# Patient Record
Sex: FEMALE | Race: WHITE | Employment: UNEMPLOYED | URBAN - METROPOLITAN AREA
[De-identification: names, ages, dates, MRNs, and addresses within clinical notes are randomized per-mention and may not be internally consistent; named-entity substitution may affect disease eponyms.]

---
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
## 2025-01-05 ENCOUNTER — APPOINTMENT (OUTPATIENT)
Dept: GENERAL RADIOLOGY | Age: 20
End: 2025-01-05
Payer: MEDICAID

## 2025-01-05 ENCOUNTER — HOSPITAL ENCOUNTER (EMERGENCY)
Age: 20
Discharge: HOME | End: 2025-01-05
Payer: MEDICAID

## 2025-01-05 VITALS
TEMPERATURE: 99.9 F | BODY MASS INDEX: 25.8 KG/M2 | DIASTOLIC BLOOD PRESSURE: 88 MMHG | HEART RATE: 108 BPM | OXYGEN SATURATION: 95 % | HEIGHT: 67 IN | WEIGHT: 164.4 LBS | RESPIRATION RATE: 18 BRPM | SYSTOLIC BLOOD PRESSURE: 144 MMHG

## 2025-01-05 DIAGNOSIS — B97.89 VIRAL RESPIRATORY ILLNESS: Primary | ICD-10-CM

## 2025-01-05 DIAGNOSIS — J98.8 VIRAL RESPIRATORY ILLNESS: Primary | ICD-10-CM

## 2025-01-05 LAB
FLUAV RNA UPPER RESP QL NAA+PROBE: NEGATIVE
FLUBV AG NPH QL: NEGATIVE
SARS-COV-2 RDRP RESP QL NAA+PROBE: NOT DETECTED

## 2025-01-05 PROCEDURE — 6370000000 HC RX 637 (ALT 250 FOR IP): Performed by: PHYSICIAN ASSISTANT

## 2025-01-05 PROCEDURE — 6360000002 HC RX W HCPCS: Performed by: PHYSICIAN ASSISTANT

## 2025-01-05 PROCEDURE — 87635 SARS-COV-2 COVID-19 AMP PRB: CPT

## 2025-01-05 PROCEDURE — 71045 X-RAY EXAM CHEST 1 VIEW: CPT

## 2025-01-05 PROCEDURE — 87804 INFLUENZA ASSAY W/OPTIC: CPT

## 2025-01-05 PROCEDURE — 99284 EMERGENCY DEPT VISIT MOD MDM: CPT

## 2025-01-05 RX ORDER — ALBUTEROL SULFATE 90 UG/1
1-2 INHALANT RESPIRATORY (INHALATION) EVERY 6 HOURS PRN
Qty: 18 G | Refills: 0 | Status: SHIPPED | OUTPATIENT
Start: 2025-01-05

## 2025-01-05 RX ORDER — ALBUTEROL SULFATE 0.83 MG/ML
2.5 SOLUTION RESPIRATORY (INHALATION) ONCE
Status: COMPLETED | OUTPATIENT
Start: 2025-01-05 | End: 2025-01-05

## 2025-01-05 RX ORDER — BUTALBITAL, ACETAMINOPHEN AND CAFFEINE 50; 325; 40 MG/1; MG/1; MG/1
1 TABLET ORAL ONCE
Status: COMPLETED | OUTPATIENT
Start: 2025-01-05 | End: 2025-01-05

## 2025-01-05 RX ORDER — IBUPROFEN 600 MG/1
600 TABLET, FILM COATED ORAL
Status: COMPLETED | OUTPATIENT
Start: 2025-01-05 | End: 2025-01-05

## 2025-01-05 RX ORDER — IPRATROPIUM BROMIDE AND ALBUTEROL SULFATE 2.5; .5 MG/3ML; MG/3ML
1 SOLUTION RESPIRATORY (INHALATION) ONCE
Status: COMPLETED | OUTPATIENT
Start: 2025-01-05 | End: 2025-01-05

## 2025-01-05 RX ORDER — ACETAMINOPHEN 325 MG/1
650 TABLET ORAL
Status: COMPLETED | OUTPATIENT
Start: 2025-01-05 | End: 2025-01-05

## 2025-01-05 RX ORDER — DEXTROMETHORPHAN HYDROBROMIDE AND PROMETHAZINE HYDROCHLORIDE 15; 6.25 MG/5ML; MG/5ML
5 SYRUP ORAL EVERY 6 HOURS PRN
Qty: 120 ML | Refills: 0 | Status: SHIPPED | OUTPATIENT
Start: 2025-01-05 | End: 2025-01-12

## 2025-01-05 RX ORDER — PREDNISONE 10 MG/1
20 TABLET ORAL DAILY
Qty: 10 TABLET | Refills: 0 | Status: SHIPPED | OUTPATIENT
Start: 2025-01-05 | End: 2025-01-10

## 2025-01-05 RX ORDER — PREDNISONE 20 MG/1
20 TABLET ORAL ONCE
Status: COMPLETED | OUTPATIENT
Start: 2025-01-05 | End: 2025-01-05

## 2025-01-05 RX ORDER — IBUPROFEN 600 MG/1
600 TABLET, FILM COATED ORAL
Qty: 30 TABLET | Refills: 0 | Status: SHIPPED | OUTPATIENT
Start: 2025-01-05

## 2025-01-05 RX ADMIN — PREDNISONE 20 MG: 20 TABLET ORAL at 11:26

## 2025-01-05 RX ADMIN — ALBUTEROL SULFATE 2.5 MG: 2.5 SOLUTION RESPIRATORY (INHALATION) at 11:26

## 2025-01-05 RX ADMIN — IPRATROPIUM BROMIDE AND ALBUTEROL SULFATE 1 DOSE: 2.5; .5 SOLUTION RESPIRATORY (INHALATION) at 12:18

## 2025-01-05 RX ADMIN — IBUPROFEN 600 MG: 600 TABLET, FILM COATED ORAL at 11:21

## 2025-01-05 RX ADMIN — BUTALBITAL, ACETAMINOPHEN, AND CAFFEINE 1 TABLET: 50; 325; 40 TABLET ORAL at 11:21

## 2025-01-05 RX ADMIN — ACETAMINOPHEN 650 MG: 325 TABLET ORAL at 11:21

## 2025-01-05 ASSESSMENT — LIFESTYLE VARIABLES
HOW MANY STANDARD DRINKS CONTAINING ALCOHOL DO YOU HAVE ON A TYPICAL DAY: PATIENT DOES NOT DRINK
HOW OFTEN DO YOU HAVE A DRINK CONTAINING ALCOHOL: NEVER

## 2025-01-05 ASSESSMENT — PAIN - FUNCTIONAL ASSESSMENT: PAIN_FUNCTIONAL_ASSESSMENT: 0-10

## 2025-01-05 ASSESSMENT — PAIN SCALES - GENERAL
PAINLEVEL_OUTOF10: 5
PAINLEVEL_OUTOF10: 5
PAINLEVEL_OUTOF10: 4

## 2025-01-05 NOTE — ED PROVIDER NOTES
is aware to return should symptoms not improve or worsen.      I am the Primary Clinician of Record.  FINAL IMPRESSION      1. Viral respiratory illness          DISPOSITION/PLAN     DISPOSITION Decision To Discharge 01/05/2025 12:48:04 PM   DISPOSITION CONDITION Stable           PATIENT REFERRED TO:  Yasmeen Marks MD  [address] [place] [id number]  [phone]    Schedule an appointment as soon as possible for a visit in 3 days  As needed    [facility] ED  [address] [id number] [phone]  Go to   If symptoms worsen      DISCHARGE MEDICATIONS:  Discharge Medication List as of 1/5/2025 12:53 PM        START taking these medications    Details   albuterol sulfate HFA (PROVENTIL;VENTOLIN;PROAIR) 108 (90 Base) MCG/ACT inhaler Inhale 1-2 puffs into the lungs every 6 hours as needed for Wheezing, Disp-18 g, R-0Dispense with spacerNormal      promethazine-dextromethorphan (PROMETHAZINE-DM) 6.25-15 MG/5ML syrup Take 5 mLs by mouth every 6 hours as needed for Cough, Disp-120 mL, R-0Normal      predniSONE (DELTASONE) 10 MG tablet Take 2 tablets by mouth daily for 5 doses, Disp-10 tablet, R-0Normal      ibuprofen (ADVIL;MOTRIN) 600 MG tablet Take 1 tablet by mouth every 6-8 hours as needed for Pain, Disp-30 tablet, R-0Normal             DISCONTINUED MEDICATIONS:  Discharge Medication List as of 1/5/2025 12:53 PM                 (Please note that portions of this note were completed with a voice recognition program.  Efforts were made to edit the dictations but occasionally words are mis-transcribed.)    Brendon Mccabe PA-C (electronically signed)        Brendon Mccabe PA-C  01/05/25 [id number]

## 2025-01-05 NOTE — DISCHARGE INSTRUCTIONS
Your symptoms are consistent with influenza type A.  However flu study and COVID studies are negative.  Chest x-ray negative.  Wheezing noted.  Stop vaping.  Breathing treatments given with wheezing improvement in your chest.  First dose prednisone given in ED.  I have sent prescriptions for prednisone, cough medication and inhaler to local pharmacy.  Maintain good nutrition and increase hydration over the next couple of days.  Rest as needed.

## 2025-01-13 NOTE — ED PROVIDER NOTES
[address] [facility] 1 ED  eMERGENCY dEPARTMENT eNCOUnter      Pt Name: Francisco Watts  MRN: [id number]  [address] [facility] 2005  Date of evaluation: 9/11/2023  Provider: Lizette Matamoros MD    [address]       Chief Complaint   Patient presents with    Psychiatric Evaluation     Pt states she attempted suicide today by trying to drown herself in [address], bystanders called [phone] and stated they never saw pt go under water, pt denies any complaints at this time. HISTORY OF PRESENT ILLNESS   (Location/Symptom, Timing/Onset, Context/Setting, Quality, Duration, Modifying Factors, Severity)  Note limiting factors. History obtained from: the patient    Francisco Watts is a 25 y.o. female with history of depression who reports she is currently taking Zoloft who is brought to the emergency department by EMS after a intentional drowning attempt. The patient reports she was attempting to commit suicide today by drowning herself in the [facility]. [place]ers [place] and [place]. Patient reports she was floating on top of the leg but never had her head go underwater and denies any water going in her nose or mouth. Patient denies any shortness of breath or difficulty breathing at this time. Patient does report active suicidal ideation and depression. HPI    Nursing Notes were reviewed. REVIEW OFSYSTEMS    (2-9 systems for level 4, 10 or more for level 5)     Review of Systems   Constitutional:  Negative for appetite change, fever and unexpected weight change. HENT:  Negative for facial swelling, trouble swallowing and voice change. Eyes:  Negative for visual disturbance. Respiratory:  Negative for shortness of breath, wheezing and stridor. Cardiovascular:  Negative for chest pain and palpitations. Gastrointestinal:  Negative for abdominal pain, nausea and vomiting. Genitourinary:  Negative for dysuria and vaginal bleeding.    Musculoskeletal:  Negative Hematology/Oncology Follow Up    Date of Visit: 01/13/25  CC: Follow Up  Iron Deficiency Anemia    HISTORY OF PRESENT ILLNESS:  Ms. Amparo Hess is a 71 year old female patient with history of anemia, arrhythmia, asthma, chronic insomnia, chronic pain, degenerative joint disease, DVT LE 2013, GERD, fibromyalgia, hyperlipidemia, HTN, MGUS, pre-diabetes referred to our clinic for consultation regarding iron deficiency anemia    Patient recently admitted 4/2024 to [facility] for chest pain ACS rule out. EKG no ischmic changes and the troponin was unchanged. CT PE negative for PE. She was found to have iron deficiency anemia and sent home for ferrous sulfate. She was noted to have never had a colonoscopy and recommended to f/u w/PCP to arrange this. Ferritin 68, 6% iron saturation, Hb 9.1.    5/8/24 she was seen by her PCP for follow up post-hospitalization. Her Hb improved to 10.8. MCV of 75.9. Plt and WBC WNL.    Blood in stool/dark stool: denies  Vaginal bleeding: denies  Epistaxis: denies  Taking oral iron: yes, taking when she remembers, probably 3-4 times per week    12/2024: IV iron venofer 200mg x3    Interval/Subjective:  The patient presents for evaluation of iron deficiency, MGUS, and weight loss.    She has not yet undergone a colonoscopy and reports no bleeding, including hematochezia, melena, vaginal bleeding, or epistaxis. She also reports no gastrointestinal symptoms such as diarrhea or constipation. She was previously on iron supplementation, which was discontinued due to its efficacy. She received intravenous iron in 12/2024. She reports no shortness of breath or pagophagia but notes a high intake of cold water, approximately 6 to 8 bottles daily. She experiences difficulty swallowing certain foods, leading to regurgitation, despite thorough mastication.    She has a history of MGUS, for which she was under the care of Ralf for several years. She reports no abdominal pain.    She has experienced a  weight loss of 40 pounds over a period of 6 months, which she attributes to occasional forgetfulness in eating. She does not endorse any depressive symptoms. Her primary care physician, Dr. Pham, deemed the weight loss beneficial due to her previous overweight status. She gained 5 pounds during the holiday season. The weight loss trend ceased around 09/2024 or 10/2024.    Supplemental Information  She is scheduled to receive a hip injection. She reports pain in her hip and back.    FAMILY HISTORY  Her mother had polyps.    MEDICATIONS  Discontinued: iron pills          Past Medical History:   Diagnosis Date    Adjustment reaction to chronic stress 12/17/2015    Anemia     Arrhythmia 12/08/2011    Asthma     Atypical chest pain, chronic:  cards (froedert) 04/15/2013    Blood clot associated with vein wall inflammation     Blood transfusion     39 yrs ago    Chronic insomnia 09/22/2016    Chronic pain syndrome 12/17/2015    DJD (degenerative joint disease)     DVT of leg (deep venous thrombosis), left  06/22/2013    Dysphagia, unspecified:  GI 11/16/2011    Dysrhythmia     Elevated serum protein level     heme; neg eval    Fibromyalgia     GERD (gastroesophageal reflux disease) 05/06/2014    h/o neurocardiogenic syncope     Hypercholesterolemia     Hyperlipidemia     Hypertension     not on meds    MGUS (monoclonal gammopathy of unknown significance): heme yrly/prn 12/17/2015    Motion sickness     MVP (mitral valve prolapse)     Myalgia and myositis, unspecified 12/08/2011    Neurologic cardiac syncope 04/03/2012    resolved    Osteoarthritis     Osteoarthrosis, unspecified whether generalized or localized, lower leg: ortho: needs BTKR 09/13/2012    Other and unspecified hyperlipidemia 12/08/2011    Palpitations     PONV (postoperative nausea and vomiting)     extreme vertigo    Pre-diabetes 12/08/2011    Snoring: sleep study 09/22/2016       Past Surgical History:   Procedure Laterality Date    Carpal tunnel  release      bilatreral     delivery+postpartum care           delivery+postpartum care          Hysterectomy      hyst    Knee surgery      several knee scopes.  left knee    Total knee replacement Left 2021    Scout, Left TKA (prior HTO), [employer] implants    Total knee replacement Right 2024    Scout, Right TKA, [employer] implants     OB History    Para Term  AB Living   3 0 0 0 0 0   SAB IAB Ectopic Molar Multiple Live Births   0 0 0 0 0 2       ALLERGIES:  is allergic to penicillins.    MEDICATIONS:  Current Outpatient Medications   Medication Sig    electrolyte/PEG 3350 (Nulytely with Flavor Packs) 420 g solution Take as directed per colonoscopy prep letter    vitamin B-12 (CYANOCOBALAMIN) 1000 MCG tablet Take 1 tablet by mouth daily.    ferrous sulfate 325 (65 FE) MG tablet Take 325 mg by mouth every 48 hours.    acetaminophen (TYLENOL) 500 MG tablet Take 2 tablets by mouth every 8 hours.    lidocaine (LIDODERM) 5 % patch Place 3 patches onto the skin every 24 hours. Remove patch 12 hours after applying    Multiple Vitamin (MULTIVITAMIN ADULT PO) Take by mouth daily.     No current facility-administered medications for this visit.       Social History     Socioeconomic History    Marital status: Single     Spouse name: Not on file    Number of children: Not on file    Years of education: Not on file    Highest education level: Not on file   Occupational History    Not on file   Tobacco Use    Smoking status: Never    Smokeless tobacco: Never   Vaping Use    Vaping status: never used   Substance and Sexual Activity    Alcohol use: No    Drug use: No    Sexual activity: Not Currently     Comment: 15 yrs ago   Other Topics Concern    Not on file   Social History Narrative    Not on file     Social Determinants of Health     Financial Resource Strain: Low Risk  (2024)    Financial Resource Strain     Unable to Get: None   Food  Insecurity: No Food Insecurity (2024)    Received from [facility], [facility]    Hunger Vital Sign     Worried About Running Out of Food in the Last Year: Never true     Ran Out of Food in the Last Year: Never true   Transportation Needs: No Transportation Needs (2024)    Received from [facility], [facility]    PRAPARE - Transportation     Lack of Transportation (Medical): No     Lack of Transportation (Non-Medical): No   Physical Activity: High Risk (2024)    Exercise Vital Sign     Days of Exercise per Week: 0 days     Minutes of Exercise per Session: 0 min   Stress: High Risk (2024)    Stress     How Stressed: Quite a bit   Social Connections: Unknown (2024)    Received from [facility]    Social Connections     Social Connections: Last Flowsheet Data: Not on file     Social Connections: Recent Result: Not on file   Interpersonal Safety: Not At Risk (2024)    Received from [facility], [facility]    Humiliation, Afraid, Rape, and Kick questionnaire     Fear of Current or Ex-Partner: No     Emotionally Abused: No     Physically Abused: No     Sexually Abused: No       Family Status   Relation Name Status    Mo  Alive        alzheimers, diverticulitis.     Fa   at age 85        heart diseae, HTN    Bro x2 Alive        dm, heart dz, blind    Bro  Alive    Bianca  Alive    Son  Alive        heart nos   No partnership data on file       REVIEW OF SYSTEMS:   A 12 point review of systems was reviewed with the patient.  Pertinent positive and negative results are discussed above.  The entire review of systems is detailed in the Epic electronic health record.    PHYSICAL EXAMINATION:    The patient is a 71 year old female who is in no apparent distress.  VITALS:  Visit Vitals  BP (!)  142/94   Pulse (!) 57   Temp 97.1 °F (36.2 °C) (Oral)   Resp 16   Wt 91 kg (200 lb 9.6 oz)   SpO2 97%   BMI 33.38 kg/m²      ECO - Fully active, able to carry on all pre-disease activities without restrictions.  CONSTITUTIONAL:  Alert, cooperative, oriented.  Mood and affect appropriate.  Appears close to chronological age.  Well nourished.  Well developed.   HEENT:  Normocephalic, atraumatic.  Pupils are equal, round, reactive to light.  Extraocular movements are intact.  Sclerae anicteric.  Conjunctivae and corneas are clear. Oropharynx and oral cavity are unremarkable.    SKIN:  Skin examination is without rash or lesions.   PULMONARY:  Clear to auscultation without wheeze or rhonchi  CARDIOVASCULAR:  Cardiac examination shows a regular rate and rhythm.  Normal S1 and S2.  No murmur, rub or gallop.  ABDOMEN:  Abdomen is soft, nontender, nondistended with normoactive bowel sounds.  There is no hepatosplenomegaly.  EXTREMITIES:  Extremities are without clubbing, cyanosis or edema.   NEUROLOGIC:  Alert and oriented x 3.  Cranial nerves II-XII grossly intact.  PSYCHIATRIC:  Appropriate affect. Coherent speech.      LABS:  WBC (K/mcL)   Date Value   2025 4.7     RBC (mil/mcL)   Date Value   2025 5.09     HCT (%)   Date Value   2025 43.5     HGB (g/dL)   Date Value   2025 13.9     PLT (K/mcL)   Date Value   2025 266         RADIOLOGICAL DATA:    Reviewed in EPIC      ASSESSMENT:  Ms. Amparo Hess is a 71 year old female patient  history of anemia, arrhythmia, asthma, chronic insomnia, chronic pain, degenerative joint disease, DVT LE , GERD, fibromyalgia, hyperlipidemia, HTN, MGUS, pre-diabetes, recent iron deficiency anemia.      Iron Deficiency Anemia. Found during inpatient 2024 ACS ruleout admission to [facility]. No overt bleeding symptoms. Has never had colonoscopy which has already been ordered. Still has not had this. S/p IV venofer 2024. Following iron  studies/ferritin/cbc    2. MGUS: previous studies 2012 saw possible IgG kappa M-protein also noted to be polyclonal. She was followed in [facility] hematology clinic for many years, last 2019.    3. Hx DVT LE 2013: States related to a fall. Was on anticoagulation for about 3 months. Currently remains off AC.    4. Weight Loss: 40 lbs unintentional. States she has started gaining back some of this weight. Just didn't feel hungry. She is uptodate on her mammograms. I continue to recommend she get the colonoscopy done and also let us know if she has continued weight loss so we can investigate further.    RECOMMENDATIONS:  Follow up with GI referral/colonoscopy  Follow iron studies, ferritin  Call if weight loss worsens  Recheck plasma cell profile with next labs  Return in 6 months with labs and MD visit    Follow up today's iron studies    Discussed with patient the natural history, course and prognosis of illness. Therapeutic options discussed and explained.  Side effects, risks and benefits, and alternatives discussed.  Patient acknowledges understanding of disease and treatment plan.    Chad Glisch, MD  Hematology & Medical Oncology  [facility]

## 2025-04-02 NOTE — PROGRESS NOTES
Follow up call to home care nurse Rylie, reviewed original lab work that was ordered, discussed that patient has cancelled all her lab appointments this week, waiting on confirmation to pull midlline.  Rylie is still scheduled  to see patient on Monday, will get UA/UC  and attempt to get blood work.    All questions answered.    Linda Tilley, BSN, RN  RN Care Coordinator Infectious Disease Clinic     Patient arrived on unit via wheelchair accompanied by security and staff member. Patient calm and cooperative. Patient vital signs obtained and offered snacks and fluids. Belongings placed in safe. Patient in hospital safety gown.

## 2025-07-07 ENCOUNTER — HOSPITAL ENCOUNTER (OUTPATIENT)
Age: 20
Setting detail: OBSERVATION
Discharge: HOME | End: 2025-07-08
Attending: STUDENT IN AN ORGANIZED HEALTH CARE EDUCATION/TRAINING PROGRAM | Admitting: SURGERY
Payer: MEDICAID

## 2025-07-07 ENCOUNTER — APPOINTMENT (OUTPATIENT)
Dept: CT IMAGING | Age: 20
End: 2025-07-07
Payer: MEDICAID

## 2025-07-07 DIAGNOSIS — Z90.49 S/P LAPAROSCOPIC APPENDECTOMY: ICD-10-CM

## 2025-07-07 DIAGNOSIS — K35.80 ACUTE APPENDICITIS: Primary | ICD-10-CM

## 2025-07-07 DIAGNOSIS — K35.80 ACUTE APPENDICITIS, UNSPECIFIED ACUTE APPENDICITIS TYPE: ICD-10-CM

## 2025-07-07 LAB
ALBUMIN SERPL-MCNC: 4.1 G/DL (ref 3.4–5)
ALBUMIN/GLOB SERPL: 1.4 {RATIO} (ref 1.1–2.2)
ALP SERPL-CCNC: 59 U/L (ref 40–129)
ALT SERPL-CCNC: 12 U/L (ref 10–40)
ANION GAP SERPL CALCULATED.3IONS-SCNC: 12 MMOL/L (ref 3–16)
AST SERPL-CCNC: 18 U/L (ref 15–37)
BASOPHILS # BLD: 0 K/UL (ref 0–0.2)
BASOPHILS NFR BLD: 0.3 %
BILIRUB SERPL-MCNC: 1 MG/DL (ref 0–1)
BILIRUB UR QL STRIP.AUTO: NEGATIVE
BUN SERPL-MCNC: 9 MG/DL (ref 7–20)
CALCIUM SERPL-MCNC: 9.2 MG/DL (ref 8.3–10.6)
CHLORIDE SERPL-SCNC: 99 MMOL/L (ref 99–110)
CLARITY UR: CLEAR
CO2 SERPL-SCNC: 25 MMOL/L (ref 21–32)
COLOR UR: YELLOW
CREAT SERPL-MCNC: 0.6 MG/DL (ref 0.6–1.1)
DEPRECATED RDW RBC AUTO: 13.3 % (ref 12.4–15.4)
EOSINOPHIL # BLD: 0.1 K/UL (ref 0–0.6)
EOSINOPHIL NFR BLD: 1 %
GFR SERPLBLD CREATININE-BSD FMLA CKD-EPI: >90 ML/MIN/{1.73_M2}
GLUCOSE SERPL-MCNC: 93 MG/DL (ref 70–99)
GLUCOSE UR STRIP.AUTO-MCNC: NEGATIVE MG/DL
HCG SERPL QL: NEGATIVE
HCT VFR BLD AUTO: 40.4 % (ref 36–48)
HGB BLD-MCNC: 13.7 G/DL (ref 12–16)
HGB UR QL STRIP.AUTO: NEGATIVE
KETONES UR STRIP.AUTO-MCNC: NEGATIVE MG/DL
LEUKOCYTE ESTERASE UR QL STRIP.AUTO: NEGATIVE
LIPASE SERPL-CCNC: 18 U/L (ref 13–60)
LYMPHOCYTES # BLD: 2.6 K/UL (ref 1–5.1)
LYMPHOCYTES NFR BLD: 24.5 %
MCH RBC QN AUTO: 30.8 PG (ref 26–34)
MCHC RBC AUTO-ENTMCNC: 33.9 G/DL (ref 31–36)
MCV RBC AUTO: 90.7 FL (ref 80–100)
MONOCYTES # BLD: 0.6 K/UL (ref 0–1.3)
MONOCYTES NFR BLD: 5.8 %
NEUTROPHILS # BLD: 7.3 K/UL (ref 1.7–7.7)
NEUTROPHILS NFR BLD: 68.4 %
NITRITE UR QL STRIP.AUTO: NEGATIVE
PH UR STRIP.AUTO: 7.5 [PH] (ref 5–8)
PLATELET # BLD AUTO: 262 K/UL (ref 135–450)
PMV BLD AUTO: 8.4 FL (ref 5–10.5)
POTASSIUM SERPL-SCNC: 3.6 MMOL/L (ref 3.5–5.1)
PROT SERPL-MCNC: 7.1 G/DL (ref 6.4–8.2)
PROT UR STRIP.AUTO-MCNC: NEGATIVE MG/DL
RBC # BLD AUTO: 4.46 M/UL (ref 4–5.2)
SODIUM SERPL-SCNC: 136 MMOL/L (ref 136–145)
SP GR UR STRIP.AUTO: 1.01 (ref 1–1.03)
UA COMPLETE W REFLEX CULTURE PNL UR: NORMAL
UA DIPSTICK W REFLEX MICRO PNL UR: NORMAL
URN SPEC COLLECT METH UR: NORMAL
UROBILINOGEN UR STRIP-ACNC: 0.2 E.U./DL
WBC # BLD AUTO: 10.6 K/UL (ref 4–11)

## 2025-07-07 PROCEDURE — 81003 URINALYSIS AUTO W/O SCOPE: CPT

## 2025-07-07 PROCEDURE — G0378 HOSPITAL OBSERVATION PER HR: HCPCS

## 2025-07-07 PROCEDURE — 74177 CT ABD & PELVIS W/CONTRAST: CPT

## 2025-07-07 PROCEDURE — 96365 THER/PROPH/DIAG IV INF INIT: CPT

## 2025-07-07 PROCEDURE — 2580000003 HC RX 258: Performed by: NURSE PRACTITIONER

## 2025-07-07 PROCEDURE — 6360000004 HC RX CONTRAST MEDICATION: Performed by: NURSE PRACTITIONER

## 2025-07-07 PROCEDURE — 80053 COMPREHEN METABOLIC PANEL: CPT

## 2025-07-07 PROCEDURE — 85025 COMPLETE CBC W/AUTO DIFF WBC: CPT

## 2025-07-07 PROCEDURE — 99285 EMERGENCY DEPT VISIT HI MDM: CPT

## 2025-07-07 PROCEDURE — 83690 ASSAY OF LIPASE: CPT

## 2025-07-07 PROCEDURE — 84703 CHORIONIC GONADOTROPIN ASSAY: CPT

## 2025-07-07 PROCEDURE — 96374 THER/PROPH/DIAG INJ IV PUSH: CPT

## 2025-07-07 PROCEDURE — 6360000002 HC RX W HCPCS: Performed by: NURSE PRACTITIONER

## 2025-07-07 RX ORDER — POTASSIUM CHLORIDE 7.45 MG/ML
10 INJECTION INTRAVENOUS PRN
Status: DISCONTINUED | OUTPATIENT
Start: 2025-07-07 | End: 2025-07-08 | Stop reason: HOSPADM

## 2025-07-07 RX ORDER — SODIUM CHLORIDE 0.9 % (FLUSH) 0.9 %
5-40 SYRINGE (ML) INJECTION PRN
Status: DISCONTINUED | OUTPATIENT
Start: 2025-07-07 | End: 2025-07-08 | Stop reason: HOSPADM

## 2025-07-07 RX ORDER — MAGNESIUM SULFATE IN WATER 40 MG/ML
2000 INJECTION, SOLUTION INTRAVENOUS PRN
Status: DISCONTINUED | OUTPATIENT
Start: 2025-07-07 | End: 2025-07-08 | Stop reason: HOSPADM

## 2025-07-07 RX ORDER — POTASSIUM CHLORIDE 1500 MG/1
40 TABLET, EXTENDED RELEASE ORAL PRN
Status: DISCONTINUED | OUTPATIENT
Start: 2025-07-07 | End: 2025-07-08 | Stop reason: HOSPADM

## 2025-07-07 RX ORDER — SODIUM CHLORIDE 9 MG/ML
INJECTION, SOLUTION INTRAVENOUS PRN
Status: DISCONTINUED | OUTPATIENT
Start: 2025-07-07 | End: 2025-07-08 | Stop reason: HOSPADM

## 2025-07-07 RX ORDER — SERTRALINE HYDROCHLORIDE 100 MG/1
100 TABLET, FILM COATED ORAL NIGHTLY
Status: DISCONTINUED | OUTPATIENT
Start: 2025-07-07 | End: 2025-07-07

## 2025-07-07 RX ORDER — ALBUTEROL SULFATE 90 UG/1
2 INHALANT RESPIRATORY (INHALATION) EVERY 6 HOURS PRN
Status: DISCONTINUED | OUTPATIENT
Start: 2025-07-07 | End: 2025-07-08 | Stop reason: HOSPADM

## 2025-07-07 RX ORDER — SODIUM CHLORIDE 9 MG/ML
INJECTION, SOLUTION INTRAVENOUS CONTINUOUS
Status: DISCONTINUED | OUTPATIENT
Start: 2025-07-07 | End: 2025-07-08 | Stop reason: HOSPADM

## 2025-07-07 RX ORDER — IOPAMIDOL 755 MG/ML
75 INJECTION, SOLUTION INTRAVASCULAR
Status: COMPLETED | OUTPATIENT
Start: 2025-07-07 | End: 2025-07-07

## 2025-07-07 RX ORDER — ONDANSETRON 4 MG/1
4 TABLET, ORALLY DISINTEGRATING ORAL EVERY 8 HOURS PRN
Status: DISCONTINUED | OUTPATIENT
Start: 2025-07-07 | End: 2025-07-08 | Stop reason: HOSPADM

## 2025-07-07 RX ORDER — ONDANSETRON 2 MG/ML
4 INJECTION INTRAMUSCULAR; INTRAVENOUS EVERY 6 HOURS PRN
Status: DISCONTINUED | OUTPATIENT
Start: 2025-07-07 | End: 2025-07-08 | Stop reason: HOSPADM

## 2025-07-07 RX ORDER — MORPHINE SULFATE 2 MG/ML
2 INJECTION, SOLUTION INTRAMUSCULAR; INTRAVENOUS
Status: DISCONTINUED | OUTPATIENT
Start: 2025-07-07 | End: 2025-07-08 | Stop reason: HOSPADM

## 2025-07-07 RX ORDER — SODIUM CHLORIDE 0.9 % (FLUSH) 0.9 %
5-40 SYRINGE (ML) INJECTION EVERY 12 HOURS SCHEDULED
Status: DISCONTINUED | OUTPATIENT
Start: 2025-07-07 | End: 2025-07-08 | Stop reason: HOSPADM

## 2025-07-07 RX ORDER — ENOXAPARIN SODIUM 100 MG/ML
40 INJECTION SUBCUTANEOUS DAILY
Status: DISCONTINUED | OUTPATIENT
Start: 2025-07-08 | End: 2025-07-08 | Stop reason: HOSPADM

## 2025-07-07 RX ORDER — ACETAMINOPHEN 325 MG/1
650 TABLET ORAL EVERY 6 HOURS PRN
Status: DISCONTINUED | OUTPATIENT
Start: 2025-07-07 | End: 2025-07-08 | Stop reason: HOSPADM

## 2025-07-07 RX ORDER — MORPHINE SULFATE 4 MG/ML
4 INJECTION, SOLUTION INTRAMUSCULAR; INTRAVENOUS
Status: DISCONTINUED | OUTPATIENT
Start: 2025-07-07 | End: 2025-07-08 | Stop reason: HOSPADM

## 2025-07-07 RX ADMIN — IOPAMIDOL 75 ML: 755 INJECTION, SOLUTION INTRAVENOUS at 18:10

## 2025-07-07 RX ADMIN — PIPERACILLIN AND TAZOBACTAM 3375 MG: 3; .375 INJECTION, POWDER, LYOPHILIZED, FOR SOLUTION INTRAVENOUS at 21:11

## 2025-07-07 ASSESSMENT — PAIN DESCRIPTION - LOCATION: LOCATION: ABDOMEN

## 2025-07-07 ASSESSMENT — PAIN - FUNCTIONAL ASSESSMENT: PAIN_FUNCTIONAL_ASSESSMENT: 0-10

## 2025-07-07 ASSESSMENT — PAIN SCALES - GENERAL: PAINLEVEL_OUTOF10: 7

## 2025-07-07 ASSESSMENT — PAIN DESCRIPTION - DESCRIPTORS: DESCRIPTORS: CRAMPING

## 2025-07-07 ASSESSMENT — PAIN DESCRIPTION - ORIENTATION: ORIENTATION: LOWER

## 2025-07-08 ENCOUNTER — ANESTHESIA (OUTPATIENT)
Dept: OPERATING ROOM | Age: 20
End: 2025-07-08
Payer: MEDICAID

## 2025-07-08 ENCOUNTER — ANESTHESIA EVENT (OUTPATIENT)
Dept: OPERATING ROOM | Age: 20
End: 2025-07-08
Payer: MEDICAID

## 2025-07-08 ENCOUNTER — PREP FOR PROCEDURE (OUTPATIENT)
Dept: SURGERY | Age: 20
End: 2025-07-08

## 2025-07-08 VITALS
DIASTOLIC BLOOD PRESSURE: 81 MMHG | BODY MASS INDEX: 24.96 KG/M2 | SYSTOLIC BLOOD PRESSURE: 122 MMHG | HEART RATE: 66 BPM | HEIGHT: 67 IN | TEMPERATURE: 96.9 F | RESPIRATION RATE: 18 BRPM | OXYGEN SATURATION: 100 % | WEIGHT: 159 LBS

## 2025-07-08 PROBLEM — K35.80 ACUTE APPENDICITIS: Status: RESOLVED | Noted: 2025-07-07 | Resolved: 2025-07-08

## 2025-07-08 LAB
GLUCOSE BLD-MCNC: 94 MG/DL (ref 70–99)
PERFORMED ON: NORMAL

## 2025-07-08 PROCEDURE — 3600000004 HC SURGERY LEVEL 4 BASE: Performed by: SURGERY

## 2025-07-08 PROCEDURE — 88304 TISSUE EXAM BY PATHOLOGIST: CPT

## 2025-07-08 PROCEDURE — 96372 THER/PROPH/DIAG INJ SC/IM: CPT

## 2025-07-08 PROCEDURE — 2580000003 HC RX 258: Performed by: SURGERY

## 2025-07-08 PROCEDURE — 96366 THER/PROPH/DIAG IV INF ADDON: CPT

## 2025-07-08 PROCEDURE — 2500000003 HC RX 250 WO HCPCS: Performed by: NURSE ANESTHETIST, CERTIFIED REGISTERED

## 2025-07-08 PROCEDURE — 6360000002 HC RX W HCPCS: Performed by: SURGERY

## 2025-07-08 PROCEDURE — 2580000003 HC RX 258: Performed by: NURSE ANESTHETIST, CERTIFIED REGISTERED

## 2025-07-08 PROCEDURE — APPSS30 APP SPLIT SHARED TIME 16-30 MINUTES

## 2025-07-08 PROCEDURE — 7100000000 HC PACU RECOVERY - FIRST 15 MIN: Performed by: SURGERY

## 2025-07-08 PROCEDURE — 6360000002 HC RX W HCPCS: Performed by: NURSE ANESTHETIST, CERTIFIED REGISTERED

## 2025-07-08 PROCEDURE — 3700000000 HC ANESTHESIA ATTENDED CARE: Performed by: SURGERY

## 2025-07-08 PROCEDURE — G0378 HOSPITAL OBSERVATION PER HR: HCPCS

## 2025-07-08 PROCEDURE — 96361 HYDRATE IV INFUSION ADD-ON: CPT

## 2025-07-08 PROCEDURE — 3700000001 HC ADD 15 MINUTES (ANESTHESIA): Performed by: SURGERY

## 2025-07-08 PROCEDURE — 6360000002 HC RX W HCPCS: Performed by: ANESTHESIOLOGY

## 2025-07-08 PROCEDURE — 96375 TX/PRO/DX INJ NEW DRUG ADDON: CPT

## 2025-07-08 PROCEDURE — 2709999900 HC NON-CHARGEABLE SUPPLY: Performed by: SURGERY

## 2025-07-08 PROCEDURE — 96376 TX/PRO/DX INJ SAME DRUG ADON: CPT

## 2025-07-08 PROCEDURE — 2500000003 HC RX 250 WO HCPCS: Performed by: SURGERY

## 2025-07-08 PROCEDURE — 7100000001 HC PACU RECOVERY - ADDTL 15 MIN: Performed by: SURGERY

## 2025-07-08 PROCEDURE — 3600000014 HC SURGERY LEVEL 4 ADDTL 15MIN: Performed by: SURGERY

## 2025-07-08 PROCEDURE — 2720000010 HC SURG SUPPLY STERILE: Performed by: SURGERY

## 2025-07-08 RX ORDER — FENTANYL CITRATE 50 UG/ML
INJECTION, SOLUTION INTRAMUSCULAR; INTRAVENOUS
Status: DISCONTINUED | OUTPATIENT
Start: 2025-07-08 | End: 2025-07-08 | Stop reason: SDUPTHER

## 2025-07-08 RX ORDER — SODIUM CHLORIDE 9 MG/ML
INJECTION, SOLUTION INTRAVENOUS PRN
Status: DISCONTINUED | OUTPATIENT
Start: 2025-07-08 | End: 2025-07-08 | Stop reason: HOSPADM

## 2025-07-08 RX ORDER — ROCURONIUM BROMIDE 10 MG/ML
INJECTION, SOLUTION INTRAVENOUS
Status: DISCONTINUED | OUTPATIENT
Start: 2025-07-08 | End: 2025-07-08 | Stop reason: SDUPTHER

## 2025-07-08 RX ORDER — OXYCODONE HYDROCHLORIDE 5 MG/1
5 TABLET ORAL EVERY 6 HOURS PRN
Qty: 20 TABLET | Refills: 0 | Status: SHIPPED | OUTPATIENT
Start: 2025-07-08 | End: 2025-07-13

## 2025-07-08 RX ORDER — SODIUM CHLORIDE 0.9 % (FLUSH) 0.9 %
5-40 SYRINGE (ML) INJECTION EVERY 12 HOURS SCHEDULED
Status: DISCONTINUED | OUTPATIENT
Start: 2025-07-08 | End: 2025-07-08 | Stop reason: HOSPADM

## 2025-07-08 RX ORDER — LIDOCAINE HYDROCHLORIDE 20 MG/ML
INJECTION, SOLUTION EPIDURAL; INFILTRATION; INTRACAUDAL; PERINEURAL
Status: DISCONTINUED | OUTPATIENT
Start: 2025-07-08 | End: 2025-07-08 | Stop reason: SDUPTHER

## 2025-07-08 RX ORDER — SODIUM CHLORIDE 0.9 % (FLUSH) 0.9 %
5-40 SYRINGE (ML) INJECTION PRN
Status: DISCONTINUED | OUTPATIENT
Start: 2025-07-08 | End: 2025-07-08 | Stop reason: HOSPADM

## 2025-07-08 RX ORDER — OXYCODONE HYDROCHLORIDE 5 MG/1
5 TABLET ORAL PRN
Status: DISCONTINUED | OUTPATIENT
Start: 2025-07-08 | End: 2025-07-08 | Stop reason: HOSPADM

## 2025-07-08 RX ORDER — MIDAZOLAM HYDROCHLORIDE 1 MG/ML
INJECTION, SOLUTION INTRAMUSCULAR; INTRAVENOUS
Status: DISCONTINUED | OUTPATIENT
Start: 2025-07-08 | End: 2025-07-08 | Stop reason: SDUPTHER

## 2025-07-08 RX ORDER — DEXAMETHASONE SODIUM PHOSPHATE 4 MG/ML
INJECTION, SOLUTION INTRA-ARTICULAR; INTRALESIONAL; INTRAMUSCULAR; INTRAVENOUS; SOFT TISSUE
Status: DISCONTINUED | OUTPATIENT
Start: 2025-07-08 | End: 2025-07-08 | Stop reason: SDUPTHER

## 2025-07-08 RX ORDER — MEPERIDINE HYDROCHLORIDE 25 MG/ML
12.5 INJECTION INTRAMUSCULAR; INTRAVENOUS; SUBCUTANEOUS EVERY 5 MIN PRN
Status: DISCONTINUED | OUTPATIENT
Start: 2025-07-08 | End: 2025-07-08 | Stop reason: RX

## 2025-07-08 RX ORDER — LABETALOL HYDROCHLORIDE 5 MG/ML
5 INJECTION, SOLUTION INTRAVENOUS EVERY 10 MIN PRN
Status: DISCONTINUED | OUTPATIENT
Start: 2025-07-08 | End: 2025-07-08 | Stop reason: HOSPADM

## 2025-07-08 RX ORDER — SODIUM CHLORIDE, SODIUM LACTATE, POTASSIUM CHLORIDE, AND CALCIUM CHLORIDE .6; .31; .03; .02 G/100ML; G/100ML; G/100ML; G/100ML
IRRIGANT IRRIGATION PRN
Status: DISCONTINUED | OUTPATIENT
Start: 2025-07-08 | End: 2025-07-08 | Stop reason: ALTCHOICE

## 2025-07-08 RX ORDER — MAGNESIUM HYDROXIDE 1200 MG/15ML
LIQUID ORAL CONTINUOUS PRN
Status: COMPLETED | OUTPATIENT
Start: 2025-07-08 | End: 2025-07-08

## 2025-07-08 RX ORDER — KETOROLAC TROMETHAMINE 30 MG/ML
INJECTION, SOLUTION INTRAMUSCULAR; INTRAVENOUS
Status: DISCONTINUED | OUTPATIENT
Start: 2025-07-08 | End: 2025-07-08 | Stop reason: SDUPTHER

## 2025-07-08 RX ORDER — PROPOFOL 10 MG/ML
INJECTION, EMULSION INTRAVENOUS
Status: DISCONTINUED | OUTPATIENT
Start: 2025-07-08 | End: 2025-07-08 | Stop reason: SDUPTHER

## 2025-07-08 RX ORDER — OXYCODONE AND ACETAMINOPHEN 5; 325 MG/1; MG/1
1 TABLET ORAL EVERY 4 HOURS PRN
Status: DISCONTINUED | OUTPATIENT
Start: 2025-07-08 | End: 2025-07-08 | Stop reason: HOSPADM

## 2025-07-08 RX ORDER — ONDANSETRON 2 MG/ML
4 INJECTION INTRAMUSCULAR; INTRAVENOUS
Status: DISCONTINUED | OUTPATIENT
Start: 2025-07-08 | End: 2025-07-08 | Stop reason: HOSPADM

## 2025-07-08 RX ORDER — BUPIVACAINE HYDROCHLORIDE 5 MG/ML
INJECTION, SOLUTION EPIDURAL; INTRACAUDAL; PERINEURAL PRN
Status: DISCONTINUED | OUTPATIENT
Start: 2025-07-08 | End: 2025-07-08 | Stop reason: ALTCHOICE

## 2025-07-08 RX ORDER — DIPHENHYDRAMINE HYDROCHLORIDE 50 MG/ML
12.5 INJECTION, SOLUTION INTRAMUSCULAR; INTRAVENOUS
Status: DISCONTINUED | OUTPATIENT
Start: 2025-07-08 | End: 2025-07-08 | Stop reason: HOSPADM

## 2025-07-08 RX ORDER — OXYCODONE AND ACETAMINOPHEN 5; 325 MG/1; MG/1
2 TABLET ORAL EVERY 4 HOURS PRN
Status: DISCONTINUED | OUTPATIENT
Start: 2025-07-08 | End: 2025-07-08 | Stop reason: HOSPADM

## 2025-07-08 RX ORDER — OXYCODONE HYDROCHLORIDE 5 MG/1
10 TABLET ORAL PRN
Status: DISCONTINUED | OUTPATIENT
Start: 2025-07-08 | End: 2025-07-08 | Stop reason: HOSPADM

## 2025-07-08 RX ORDER — ONDANSETRON 2 MG/ML
INJECTION INTRAMUSCULAR; INTRAVENOUS
Status: DISCONTINUED | OUTPATIENT
Start: 2025-07-08 | End: 2025-07-08 | Stop reason: SDUPTHER

## 2025-07-08 RX ORDER — DEXMEDETOMIDINE HYDROCHLORIDE 100 UG/ML
INJECTION, SOLUTION INTRAVENOUS
Status: DISCONTINUED | OUTPATIENT
Start: 2025-07-08 | End: 2025-07-08 | Stop reason: SDUPTHER

## 2025-07-08 RX ORDER — ONDANSETRON 4 MG/1
4 TABLET, FILM COATED ORAL 3 TIMES DAILY PRN
Qty: 15 TABLET | Refills: 0 | Status: SHIPPED | OUTPATIENT
Start: 2025-07-08

## 2025-07-08 RX ORDER — SODIUM CHLORIDE, SODIUM LACTATE, POTASSIUM CHLORIDE, CALCIUM CHLORIDE 600; 310; 30; 20 MG/100ML; MG/100ML; MG/100ML; MG/100ML
INJECTION, SOLUTION INTRAVENOUS
Status: DISCONTINUED | OUTPATIENT
Start: 2025-07-08 | End: 2025-07-08 | Stop reason: SDUPTHER

## 2025-07-08 RX ADMIN — Medication 20 MG: at 03:16

## 2025-07-08 RX ADMIN — Medication 20 MG: at 08:19

## 2025-07-08 RX ADMIN — ENOXAPARIN SODIUM 40 MG: 100 INJECTION SUBCUTANEOUS at 08:19

## 2025-07-08 RX ADMIN — ONDANSETRON 4 MG: 2 INJECTION INTRAMUSCULAR; INTRAVENOUS at 12:41

## 2025-07-08 RX ADMIN — SODIUM CHLORIDE, POTASSIUM CHLORIDE, SODIUM LACTATE AND CALCIUM CHLORIDE: 600; 310; 30; 20 INJECTION, SOLUTION INTRAVENOUS at 12:10

## 2025-07-08 RX ADMIN — HYDROMORPHONE HYDROCHLORIDE 0.5 MG: 1 INJECTION, SOLUTION INTRAMUSCULAR; INTRAVENOUS; SUBCUTANEOUS at 13:42

## 2025-07-08 RX ADMIN — DEXAMETHASONE SODIUM PHOSPHATE 8 MG: 4 INJECTION, SOLUTION INTRAMUSCULAR; INTRAVENOUS at 12:22

## 2025-07-08 RX ADMIN — FENTANYL CITRATE 25 MCG: 50 INJECTION INTRAMUSCULAR; INTRAVENOUS at 12:17

## 2025-07-08 RX ADMIN — MIDAZOLAM 2 MG: 1 INJECTION INTRAMUSCULAR; INTRAVENOUS at 12:10

## 2025-07-08 RX ADMIN — FENTANYL CITRATE 75 MCG: 50 INJECTION INTRAMUSCULAR; INTRAVENOUS at 12:30

## 2025-07-08 RX ADMIN — PROPOFOL 150 MG: 10 INJECTION, EMULSION INTRAVENOUS at 12:17

## 2025-07-08 RX ADMIN — Medication 10 ML: at 03:16

## 2025-07-08 RX ADMIN — LIDOCAINE HYDROCHLORIDE 100 MG: 20 INJECTION, SOLUTION EPIDURAL; INFILTRATION; INTRACAUDAL; PERINEURAL at 12:17

## 2025-07-08 RX ADMIN — ONDANSETRON 4 MG: 2 INJECTION, SOLUTION INTRAMUSCULAR; INTRAVENOUS at 13:43

## 2025-07-08 RX ADMIN — SUGAMMADEX 200 MG: 100 INJECTION, SOLUTION INTRAVENOUS at 12:43

## 2025-07-08 RX ADMIN — ROCURONIUM BROMIDE 40 MG: 10 INJECTION, SOLUTION INTRAVENOUS at 12:15

## 2025-07-08 RX ADMIN — SODIUM CHLORIDE: 0.9 INJECTION, SOLUTION INTRAVENOUS at 03:16

## 2025-07-08 RX ADMIN — PIPERACILLIN AND TAZOBACTAM 3375 MG: 3; .375 INJECTION, POWDER, LYOPHILIZED, FOR SOLUTION INTRAVENOUS at 06:53

## 2025-07-08 RX ADMIN — FENTANYL CITRATE 50 MCG: 50 INJECTION INTRAMUSCULAR; INTRAVENOUS at 12:57

## 2025-07-08 RX ADMIN — KETOROLAC TROMETHAMINE 30 MG: 30 INJECTION, SOLUTION INTRAMUSCULAR at 12:41

## 2025-07-08 RX ADMIN — DEXMEDETOMIDINE HYDROCHLORIDE 10 MCG: 100 INJECTION, SOLUTION INTRAVENOUS at 12:10

## 2025-07-08 RX ADMIN — FENTANYL CITRATE 50 MCG: 50 INJECTION INTRAMUSCULAR; INTRAVENOUS at 12:47

## 2025-07-08 ASSESSMENT — PAIN SCALES - GENERAL
PAINLEVEL_OUTOF10: 8
PAINLEVEL_OUTOF10: 2

## 2025-07-08 ASSESSMENT — PAIN SCALES - WONG BAKER: WONGBAKER_NUMERICALRESPONSE: HURTS A LITTLE BIT

## 2025-07-08 ASSESSMENT — PAIN DESCRIPTION - LOCATION: LOCATION: ABDOMEN

## 2025-07-08 ASSESSMENT — PAIN DESCRIPTION - ORIENTATION: ORIENTATION: MID

## 2025-07-08 NOTE — PLAN OF CARE
Problem: Pain  Goal: Verbalizes/displays adequate comfort level or baseline comfort level  Outcome: Progressing  Flowsheets (Taken 7/8/2025 1057)  Verbalizes/displays adequate comfort level or baseline comfort level:   Encourage patient to monitor pain and request assistance   Assess pain using appropriate pain scale

## 2025-07-08 NOTE — DISCHARGE INSTR - DIET

## 2025-07-08 NOTE — DISCHARGE INSTRUCTIONS
[facility]    Mynor Salcido M.D.              Bulmaro Martins M.D.              [facility]     Luis Cornejo M.D.               Gianni Bae M.D.  [facility] - Thursday only                                                                                                                                                         [facility] Office        [facility] Office         [facility] - Dr. Bae Only  [address]                     [address]                [address]  [address]                               [address]                                 [place] [address]  [place] [address]              [place] [address] [phone]      POST-OPERATIVE INSTRUCTIONS    Call the [place] office to schedule your post-operative appointment with Dr. Cornejo for 2 week(s).     If you have clear bandages over your incisions, you may remove them tomorrow.    Your incision(s) have been closed with one of the following:  White steri-strips; these will peel away in 7-10 days.  Surgical glue; this will dissolve   Staples, these will be removed in the office during your post-op appointment.    You may shower after removing your dressings. Wash incisions gently, pat them dry. Do not rub your incisions.    General guidelines for activity:  Avoid strenuous activity or lifting anything heavier than 15 pounds for 3 weeks.   It is OK to be up walking around; walking up and down stairs is also OK.   Do what is comfortable: stop and rest when you feel tired.     Drink plenty of fluids and stay on a bland diet for 2-3 days after surgery until your bowels are working better.    If you have pain medicine ordered. Take as directed.     Do NOT drive while taking your narcotic pain medicine.

## 2025-07-08 NOTE — PROGRESS NOTES
Pt going off the floor to surgery. No new concerns at this time. IV fluid and abx infusing per order. Vitals stable.     Bedside Mobility Assessment Tool (BMAT):     Assessment Level 1- Sit and Shake    1. From a semi-reclined position, ask patient to sit up and rotate to a seated position at the side of the bed. Can use the bedrail.    2. Ask patient to reach out and grab your hand and shake making sure patient reaches across his/her midline.   Pass- Patient is able to come to a seated position, maintain core strength. Maintains seated balance while reaching across midline. Move on to Assessment Level 2.     Assessment Level 2- Stretch and Point   1. With patient in seated position at the side of the bed, have patient place both feet on the floor (or stool) with knees no higher than hips.    2. Ask patient to stretch one leg and straighten the knee, then bend the ankle/flex and point the toes. If appropriate, repeat with the other leg.   Pass- Patient is able to demonstrate appropriate quad strength on intended weight bearing limb(s). Move onto Assessment Level 3.     Assessment Level 3- Stand   1. Ask patient to elevate off the bed or chair (seated to standing) using an assistive device (cane, bedrail).    2. Patient should be able to raise buttocks off be and hold for a count of five. May repeat once.   Pass- Patient maintains standing stability for at least 5 seconds, proceed to assessment level 4.    Assessment Level 4- Walk   1. Ask patient to march in place at bedside.    2. Then ask patient to advance step and return each foot. Some medical conditions may render a patient from stepping backwards, use your best clinical judgement.   Pass- Patient demonstrates balance while shifting weight and ability to step, takes independent steps, does not use assistive device patient is MOBILITY LEVEL 4.      Mobility Level- 4

## 2025-07-08 NOTE — CARE COORDINATION
Case Management Assessment  Initial Evaluation    Date/Time of Evaluation: 7/8/2025 10:03 AM  Assessment Completed by: Ana Berumen RN    If patient is discharged prior to next notation, then this note serves as note for discharge by case management.    Patient Name: Wendy Husain                   YOB: 2005  Diagnosis: Acute appendicitis [K35.80]                   Date / Time: 7/7/2025  4:54 PM    Patient Admission Status: Observation   Readmission Risk (Low < 19, Mod (19-27), High > 27): No data recorded  Current PCP: Yasmeen Marks MD  PCP verified by CM? Yes    Chart Reviewed: Yes      History Provided by: Patient  Patient Orientation: Alert and Oriented    Patient Cognition:      Hospitalization in the last 30 days (Readmission):  No    If yes, Readmission Assessment in  Navigator will be completed.    Advance Directives:      Code Status: Full Code   Patient's Primary Decision Maker is: Patient Declined (Legal Next of Kin Remains as Decision Maker)      Discharge Planning:    Patient lives with: (P) Parent Type of Home: (P) Trailer/Mobile Home  Primary Care Giver: Self  Patient Support Systems include: Parent   Current Financial resources: (P) Medicaid  Current community resources: (P) None  Current services prior to admission: (P) None            Current DME:              Type of Home Care services:  (P) None    ADLS  Prior functional level: (P) Independent in ADLs/IADLs  Current functional level: (P) Independent in ADLs/IADLs    PT AM-PAC:   /24  OT AM-PAC:   /24    Family can provide assistance at DC: (P) Yes  Would you like Case Management to discuss the discharge plan with any other family members/significant others, and if so, who? (P) Yes  Plans to Return to Present Housing: (P) Yes  Other Identified Issues/Barriers to RETURNING to current housing: none  Potential Assistance needed at discharge: (P) N/A            Potential DME:    Patient expects to discharge to: (P)

## 2025-07-08 NOTE — DISCHARGE SUMMARY
General Surgery   Discharge Summary    Patient Identification  Wendy Husain is a 20 y.o. female.  :  2005  Admit Date:  2025    Discharge date:   2025                                    Disposition: home    Discharge Diagnoses:   Acute appendicitis       Consults: none    Surgery: Lap appy    Patient Instructions:   Activity:  no heavy lifting, pushing or pulling > 15 lbs for 3 weeks  Diet: soft -> ADAT  Wound Care: as directed    Follow-up with Dr. Cornejo in 2 weeks.    See pre-printed instructions in chart and given to patient upon discharge.    Discharge Medications:        Medication List        START taking these medications      ondansetron 4 MG tablet  Commonly known as: ZOFRAN  Take 1 tablet by mouth 3 times daily as needed for Nausea or Vomiting     oxyCODONE 5 MG immediate release tablet  Commonly known as: Roxicodone  Take 1 tablet by mouth every 6 hours as needed for Pain for up to 5 days. Intended supply: 5 days. Take lowest dose possible to manage pain Max Daily Amount: 20 mg            STOP taking these medications      albuterol sulfate  (90 Base) MCG/ACT inhaler  Commonly known as: PROVENTIL;VENTOLIN;PROAIR     ibuprofen 600 MG tablet  Commonly known as: ADVIL;MOTRIN     naproxen 500 MG tablet  Commonly known as: NAPROSYN     sertraline 100 MG tablet  Commonly known as: ZOLOFT               Where to Get Your Medications        These medications were sent to [facility] [id number] [address] - [facility] [phone] - F [phone]  [address] [place] [address]      Phone: [phone]   ondansetron 4 MG tablet  oxyCODONE 5 MG immediate release tablet          Physical Exam:  Vitals:    25 1315 25 1340 25 1342 25 1426   BP: 100/61 106/79  112/78   Pulse: 79 62  55   Resp: 17 16 18 16   Temp:       TempSrc:       SpO2: 99% 95%  97%   Weight:       Height:         Expected postsurgical tenderness  Surgical dressings

## 2025-07-08 NOTE — PROGRESS NOTES
4 Eyes Skin Assessment     NAME:  Wendy Husain  YOB: 2005  MEDICAL RECORD NUMBER:  [id number]    The patient is being assessed for  Admission    I agree that at least one RN has performed a thorough Head to Toe Skin Assessment on the patient. ALL assessment sites listed below have been assessed.      Areas assessed by both nurses:    Head, Face, Ears, Shoulders, Back, Chest, Arms, Elbows, Hands, Sacrum. Buttock, Coccyx, Ischium, and Legs. Feet and Heels    Patient has scattered bruising, no non healing wounds at this time        Does the Patient have a Wound? No noted wound(s)       Mack Prevention initiated by RN: No  Wound Care Orders initiated by RN: No    Pressure Injury (Stage 1,2,3,4, Unstageable, DTI, NWPT, and Complex wounds) if present, place Wound referral order by RN under : No    New Ostomies, if present place, Ostomy referral order under : No     Nurse 1 eSignature: Electronically signed by Ramonita Clayton RN on 7/8/25 at 4:57 AM EDT    **SHARE this note so that the co-signing nurse can place an eSignature**    Nurse 2 eSignature: Electronically signed by Salome Morfin RN on 7/8/25 at 10:49 AM EDT

## 2025-07-08 NOTE — PLAN OF CARE
Problem: Discharge Planning  Goal: Discharge to home or other facility with appropriate resources  Outcome: Completed     Problem: Pain  Goal: Verbalizes/displays adequate comfort level or baseline comfort level  7/8/2025 1618 by Salome Morfin, RN  Outcome: Completed  7/8/2025 1054 by Salome Morfin, RN  Outcome: Progressing  Flowsheets (Taken 7/8/2025 1054)  Verbalizes/displays adequate comfort level or baseline comfort level:   Encourage patient to monitor pain and request assistance   Assess pain using appropriate pain scale

## 2025-07-08 NOTE — PROGRESS NOTES
Pt returned from surgery. She is alert and oriented. Vitals stable. Pt educated on current plan of care, post-op care, and diet. She expressed understanding. Call light within reach. No new concerns at this time.

## 2025-07-08 NOTE — FLOWSHEET NOTE
07/07/25 2303   Vital Signs   Temp 98.5 °F (36.9 °C)   Temp Source Oral   Pulse 67   Heart Rate Source Monitor   Respirations 18   /72   MAP (Calculated) 89   BP Location Right upper arm   BP Method Automatic   Patient Position Sitting   Opioid-Induced Sedation   POSS Score 1   RASS   Jimenez Agitation Sedation Scale (RASS) 0   Oxygen Therapy   SpO2 97 %   Pulse Oximeter Device Mode Intermittent   Pulse Oximeter Device Location Left;Finger   O2 Device None (Room air)   Height and Weight   Height 1.702 m (5' 7\")   Weight - Scale 72.1 kg (159 lb)   Weight Method Actual;Bed scale   BSA (Calculated - sq m) 1.85 sq meters   BMI (Calculated) 25     Patient arrived to floor from ED. Patient A+Ox4, vitals and assessments done at this time. Bed in lowest position, call light within reach.

## 2025-07-08 NOTE — ANESTHESIA POSTPROCEDURE EVALUATION
Department of Anesthesiology  Postprocedure Note    Patient: Wendy Husain  MRN: [id number]  YOB: 2005  Date of evaluation: 7/8/2025    Procedure Summary       Date: 07/08/25 Room / Location: [address]    Anesthesia Start: 1212 Anesthesia Stop: 1307    Procedure: APPENDECTOMY LAPAROSCOPIC Diagnosis:       Acute appendicitis      (Acute appendicitis [K35.80])    Surgeons: Luis Cornejo MD Responsible Provider: Mt Willis MD    Anesthesia Type: general ASA Status: 2            Anesthesia Type: No value filed.    Garcia Phase I: Garcia Score: 10    Garcia Phase II:      Anesthesia Post Evaluation    Comments: Postoperative Anesthesia Note    Name:    Wendy Husain  MRN:      [id number]    Patient Vitals in the past 12 hrs:  07/08/25 1342, Resp:18  07/08/25 1340, BP:106/79, Pulse:62, Resp:16, SpO2:95 %  07/08/25 1315, BP:100/61, Pulse:79, Resp:17, SpO2:99 %  07/08/25 1311, BP:(!) 98/57, Pulse:80, Resp:13, SpO2:98 %  07/08/25 1305, BP:(!) 101/56, Pulse:62, Resp:16, SpO2:98 %  07/08/25 1303, BP:(!) 103/56, Temp:96.9 °F (36.1 °C), Temp src:Axillary, Pulse:66, Resp:15, SpO2:96 %  07/08/25 1113, BP:115/73, Temp:98.1 °F (36.7 °C), Temp src:Oral, Pulse:65, Resp:18, SpO2:99 %  07/08/25 0815, BP:115/71, Temp:98.1 °F (36.7 °C), Temp src:Oral, Pulse:52, Resp:16, SpO2:100 %  07/08/25 0300, Temp:97.9 °F (36.6 °C), Temp src:Oral     LABS:    CBC  Lab Results       Component                Value               Date/Time                  WBC                      10.6                07/07/2025 05:31 PM        HGB                      13.7                07/07/2025 05:31 PM        HCT                      40.4                07/07/2025 05:31 PM        PLT                      262                 07/07/2025 05:31 PM   RENAL  Lab Results       Component                Value               Date/Time                  NA                       136                 07/07/2025 05:31 PM

## 2025-07-08 NOTE — H&P
General Surgery   History and Physical    Pt Name: Wendy Husain  MRN: [id number]  YOB: 2005  Primary Care Physician: Yasmeen Marks MD    Patient evaluated at the request of GILBERTO Reynolds  Reason for evaluation: Appendicitis  Date of evaluation: 7/8/2025    SUBJECTIVE:     History of Chief Complaint:    Wendy Husain is a 20 y.o. female who presented with abdominal pain.  Onset 3 days ago.  Pain localized to the right lower quadrant has been persistent and progressively worsening prompting ED evaluation.  Reports pain exacerbated by any movement and bumps in the car.  Denies fevers, chills, chest pain, shortness of breath, nausea/vomiting/diarrhea or issues with urination.  No prior abdominal surgical history      Past Medical History   has a past medical history of Depression.    Past Surgical History   has no past surgical history on file.    Medications  Prior to Admission medications    Medication Sig Start Date End Date Taking? Authorizing Provider   albuterol sulfate HFA (PROVENTIL;VENTOLIN;PROAIR) 108 (90 Base) MCG/ACT inhaler Inhale 1-2 puffs into the lungs every 6 hours as needed for Wheezing  Patient not taking: Reported on 7/7/2025 1/5/25   Brendon Mccabe PA-C   ibuprofen (ADVIL;MOTRIN) 600 MG tablet Take 1 tablet by mouth every 6-8 hours as needed for Pain  Patient not taking: Reported on 7/7/2025 1/5/25   Brendon Mccabe PA-C   naproxen (NAPROSYN) 500 MG tablet Take 1 tablet by mouth 2 times daily (with meals)  Patient not taking: Reported on 7/7/2025 7/5/24   Tommy Coffey MD   sertraline (ZOLOFT) 100 MG tablet Take 1 tablet by mouth nightly  Patient not taking: Reported on 7/7/2025 9/14/23   Miles Pollack MD    Scheduled Meds:   piperacillin-tazobactam  3,375 mg IntraVENous Q8H    sodium chloride flush  5-40 mL IntraVENous 2 times per day    enoxaparin  40 mg SubCUTAneous Daily    famotidine (PEPCID) injection  20 mg IntraVENous BID     Continuous Infusions:   sodium

## 2025-07-08 NOTE — BRIEF OP NOTE
Brief Postoperative Note      Patient: Wendy Husain  YOB: 2005  MRN: [id number]    Date of Procedure: 7/8/2025    Pre-Op Diagnosis Codes:      * Acute appendicitis [K35.80]    Post-Op Diagnosis: Same       Procedure(s):  APPENDECTOMY LAPAROSCOPIC    Surgeon(s):  Birgit Cornejo MD    Assistant:  Surgical Assistant: Pat Tavera    Anesthesia: General    Estimated Blood Loss (mL): Minimal    Complications: None    Specimens:   ID Type Source Tests Collected by Time Destination   A : Appendix Tissue Appendix SURGICAL PATHOLOGY Birgit Cornejo MD 7/8/2025 1237        Implants:  * No implants in log *      Drains: * No LDAs found *    Findings:  Infection Present At Time Of Surgery (PATOS) (choose all levels that have infection present):  No infection present  Other Findings: as above    Electronically signed by BIRGIT CORNEJO MD on 7/8/2025 at 12:52 PM

## 2025-07-08 NOTE — ED PROVIDER NOTES
EMERGENCY DEPARTMENT ENCOUNTER        Patient Name: Wendy Husain  MRN: [id number]  Birthdate 2005  Date of evaluation: 7/7/2025  Provider: Letha Franco MD  PCP: Yasmeen Marks MD  Note Started: 11:29 PM EDT 7/7/25    I independently examined and evaluated Wendy Husain. I personally saw the patient and performed a substantive portion of the visit including all aspects of the medical decision making.  I made/approved the management plan and take responsibility for the patient management.  I am the primary physician of record.    CHIEF COMPLAINT  Abdominal pain       HISTORY OF PRESENT ILLNESS  History from : Patient    Limitations to history : None    In brief, Wendy Husain is a 20 y.o. female  has a past medical history of Depression., who presents to the ED complaining of left lower quadrant pain.  Started yesterday.  Has had diarrhea.  No nausea or vomiting, no fever.  No surgery      REVIEW OF SYSTEMS  All systems reviewed, pertinent positives per HPI otherwise noted to be negative.    Focused exam revealed   PHYSICAL EXAM  ED Triage Vitals [07/07/25 1637]   BP Systolic BP Percentile Diastolic BP Percentile Temp Temp Source Pulse Respirations SpO2   135/84 -- -- 98.4 °F (36.9 °C) Oral 77 17 100 %      Height Weight - Scale         1.702 m (5' 7\") 77.1 kg (170 lb)           GENERAL APPEARANCE: Awake and alert. Cooperative. no distress.  HENT: Normocephalic. Atraumatic. Mucous membranes are moist  NECK: Supple.  Full range of motion of the neck without stiffness or pain.  EYES: PERRL. EOM's grossly intact.  HEART/CHEST: RRR. No murmurs.  Chest wall is not tender to palpation.  LUNGS: Respirations unlabored. CTAB. Good air exchange. Speaking comfortably in full sentences.   ABDOMEN: RLQ tenderness. Soft. Non-distended. No masses. No organomegaly. No guarding or rebound.   MUSCULOSKELETAL: No extremity edema. Compartments soft.  No deformity.  No tenderness in the extremities.  All extremities 
,  [place] [address]  Technical processing at [facility], [address] [place] [address]  Phone [phone]        MANDO FREEDMAN M.D., PH.D.  (Electronic Signature)  07/09/2025                                                                     Page 1 of 1   SURGICAL PATHOLOGY   POCT GLUCOSE       When ordered only abnormal lab results are displayed. All other labs were within normal range or not returned as of this dictation.    EKG: When ordered, EKG's are interpreted by the Emergency Department Physician in the absence of a cardiologist.  Please see their note for interpretation of EKG.    RADIOLOGY:   Non-plain film images such as CT, Ultrasound and MRI are read by the radiologist.      X-Ray Independently interpreted by me:     Interpretation per the Radiologist below, if available at the time of this note:    CT ABDOMEN PELVIS W IV CONTRAST Additional Contrast? None   Final Result   Dilated fluid-filled appendix with wall thickening measuring up to 8 mm. No   significant inflammatory changes.  However, findings concerning for acute   appendicitis given the clinical history.      3.8 cm dermoid cyst in the left adnexa.           CT ABDOMEN PELVIS W IV CONTRAST Additional Contrast? None  Result Date: 7/7/2025  EXAMINATION: CT OF THE ABDOMEN AND PELVIS WITH CONTRAST 7/7/2025 6:10 pm TECHNIQUE: CT of the abdomen and pelvis was performed with the administration of intravenous contrast. Multiplanar reformatted images are provided for review. Automated exposure control, iterative reconstruction, and/or weight based adjustment of the mA/kV was utilized to reduce the radiation dose to as low as reasonably achievable. COMPARISON: None. HISTORY: ORDERING SYSTEM PROVIDED HISTORY: RLQ abdominal pain TECHNOLOGIST PROVIDED HISTORY: Reason for exam:->RLQ abdominal pain Additional Contrast?->None Decision Support Exception - unselect if not a suspected or confirmed emergency medical

## 2025-07-08 NOTE — OP NOTE
[address] [address]                            OPERATIVE REPORT      PATIENT NAME: RYAN MARTINEZ               : 2005  MED REC NO: [id number]                      ROOM: [facility]  ACCOUNT NO: [id number]                       ADMIT DATE: 2025  PROVIDER: Luis Cornejo MD      DATE OF PROCEDURE:  2025    SURGEON:  Luis Cornejo MD    PREOPERATIVE DIAGNOSIS:  Acute appendicitis.    POSTOPERATIVE DIAGNOSIS:  Acute appendicitis.    PROCEDURE:  Laparoscopic appendectomy.    ANESTHESIA:  General.    ESTIMATED BLOOD LOSS:  Minimal.    INDICATIONS:  The patient is a 20-year-old woman who presented with abdominal pain.  She had point tenderness over McBurney point, and CT was concerning for acute appendicitis.    DESCRIPTION OF PROCEDURE:  After preoperative evaluation, the patient was brought into the operating suite and placed in a comfortable supine position on the operating room table.  Monitoring equipment was attached and general anesthesia was induced.  Her abdomen was sterilely prepped and draped, and a small incision was made at the inferior aspect of the umbilicus.  This was dissected down to the fascia, and a suture of 0 Ethibond was placed on either side of the midline.  The midline fascia was opened.  The peritoneal cavity was entered directly.  A Magno trocar was inserted, and the abdomen was insufflated to a pressure of 15 mmHg.  The remaining ports were placed under direct internal visualization.  She was placed in Trendelenburg, rotated to the left and the right lower quadrant was examined.  The appendix was immediately identified and grasped and elevated.  The mesoappendix was divided with LigaSure device down to the base of the appendix.  The base of the appendix was occluded with a PDS Endoloop.  The appendix was divided distally to this and placed in the Endopouch.  The mucosa of the stump

## 2025-07-08 NOTE — ED NOTES
2024 Sent [employer] to Dr. Thayer, waiting for a call back  2044 Dr. Thayer called back, speaking with Manas Reynolds, CNP  
Report given to admitting RN at bedside, going upstairs with belongings. Alert and oriented x4 when leaving ED  
concerning for acute  appendicitis given the clinical history.     3.8 cm dermoid cyst in the left adnexa.        You may also review the ED PT Care Timeline found under the Summary Tab, ED Encounter Summary, Timeline Reports, ED Patient Care Timeline.     Recommendation    Pending orders/Uncompleted orders to hand off:      Additional Comments:   If any further questions, please call Sending RN at ED

## 2025-07-29 ENCOUNTER — OFFICE VISIT (OUTPATIENT)
Dept: SURGERY | Age: 20
End: 2025-07-29

## 2025-07-29 VITALS
SYSTOLIC BLOOD PRESSURE: 126 MMHG | HEIGHT: 67 IN | DIASTOLIC BLOOD PRESSURE: 76 MMHG | BODY MASS INDEX: 25.27 KG/M2 | WEIGHT: 161 LBS

## 2025-07-29 DIAGNOSIS — Z98.890 POST-OPERATIVE STATE: Primary | ICD-10-CM

## 2025-07-29 PROCEDURE — 99024 POSTOP FOLLOW-UP VISIT: CPT | Performed by: SURGERY

## 2025-07-29 NOTE — PROGRESS NOTES
[facility] GENERAL SURGERY      S:   Patient presents s/p laparoscopic appendectomy.  She reports doing well.    O:   Comfortable         Incision sites healing well.              A:   S/P laparoscopic appendectomy    P:   Follow up as needed.

## 2025-08-08 ENCOUNTER — OFFICE VISIT (OUTPATIENT)
Age: 20
End: 2025-08-08

## 2025-08-08 VITALS
OXYGEN SATURATION: 98 % | HEART RATE: 67 BPM | HEIGHT: 67 IN | DIASTOLIC BLOOD PRESSURE: 75 MMHG | BODY MASS INDEX: 25.11 KG/M2 | SYSTOLIC BLOOD PRESSURE: 117 MMHG | TEMPERATURE: 98.4 F | WEIGHT: 160 LBS

## 2025-08-08 DIAGNOSIS — Z23 NEED FOR TETANUS BOOSTER: ICD-10-CM

## 2025-08-08 DIAGNOSIS — S69.91XA INJURY OF RIGHT LITTLE FINGER, INITIAL ENCOUNTER: Primary | ICD-10-CM

## 2025-08-08 DIAGNOSIS — S61.209A AVULSION OF SKIN OF FINGER, INITIAL ENCOUNTER: ICD-10-CM

## 2025-08-08 PROBLEM — F12.20 CANNABIS USE DISORDER, MODERATE, DEPENDENCE (HCC): Status: ACTIVE | Noted: 2021-03-05

## 2025-08-08 PROBLEM — F43.25 ADJUSTMENT DISORDER WITH MIXED DISTURBANCE OF EMOTIONS AND CONDUCT: Status: ACTIVE | Noted: 2021-12-07

## 2025-08-08 PROBLEM — F33.1 MODERATE EPISODE OF RECURRENT MAJOR DEPRESSIVE DISORDER (HCC): Status: ACTIVE | Noted: 2021-05-14

## 2025-08-08 PROBLEM — F39 MOOD DISORDER: Status: ACTIVE | Noted: 2021-03-01

## (undated) DEVICE — SEALER LAP L37CM MARYLAND JAW OPN NANO COAT MULTIFUNCTIONAL

## (undated) DEVICE — TISSUE RETRIEVAL SYSTEM: Brand: INZII RETRIEVAL SYSTEM

## (undated) DEVICE — PUMP SUC IRR TBNG L10FT W/ HNDPC ASSEMB STRYKEFLOW 2

## (undated) DEVICE — TROCAR: Brand: KII® SLEEVE

## (undated) DEVICE — LOOP LIG SUT SZ 0 L18IN ABSRB POLYDIOXANONE MFIL PDS II

## (undated) DEVICE — CUTTER ENDOSCP L340MM LIN ARTC SGL STROKE FIRING ENDOPATH

## (undated) DEVICE — TROCAR: Brand: KII FIOS FIRST ENTRY

## (undated) DEVICE — GLOVE ORANGE PI 7 1/2   MSG9075

## (undated) DEVICE — MHCZ GENERAL LAPAROSCOPY: Brand: MEDLINE INDUSTRIES, INC.

## (undated) DEVICE — TROCARS: Brand: KII® BALLOON BLUNT TIP SYSTEM

## (undated) DEVICE — GOWN SIRUS NONREIN XL W/TWL: Brand: MEDLINE INDUSTRIES, INC.